# Patient Record
Sex: FEMALE | Race: OTHER | HISPANIC OR LATINO
[De-identification: names, ages, dates, MRNs, and addresses within clinical notes are randomized per-mention and may not be internally consistent; named-entity substitution may affect disease eponyms.]

---

## 2022-09-22 PROBLEM — Z00.00 ENCOUNTER FOR PREVENTIVE HEALTH EXAMINATION: Status: ACTIVE | Noted: 2022-09-22

## 2022-09-23 ENCOUNTER — APPOINTMENT (OUTPATIENT)
Dept: ULTRASOUND IMAGING | Facility: CLINIC | Age: 42
End: 2022-09-23

## 2022-09-23 ENCOUNTER — APPOINTMENT (OUTPATIENT)
Dept: MAMMOGRAPHY | Facility: CLINIC | Age: 42
End: 2022-09-23

## 2022-09-23 PROCEDURE — 77063 BREAST TOMOSYNTHESIS BI: CPT

## 2022-09-23 PROCEDURE — 77067 SCR MAMMO BI INCL CAD: CPT

## 2022-09-23 PROCEDURE — 93880 EXTRACRANIAL BILAT STUDY: CPT

## 2022-10-10 ENCOUNTER — APPOINTMENT (OUTPATIENT)
Dept: ULTRASOUND IMAGING | Facility: CLINIC | Age: 42
End: 2022-10-10

## 2022-10-10 PROCEDURE — 76642 ULTRASOUND BREAST LIMITED: CPT | Mod: LT

## 2023-08-31 ENCOUNTER — LABORATORY RESULT (OUTPATIENT)
Age: 43
End: 2023-08-31

## 2023-08-31 ENCOUNTER — APPOINTMENT (OUTPATIENT)
Dept: RHEUMATOLOGY | Facility: CLINIC | Age: 43
End: 2023-08-31
Payer: MEDICAID

## 2023-08-31 VITALS
BODY MASS INDEX: 24.63 KG/M2 | WEIGHT: 139 LBS | RESPIRATION RATE: 17 BRPM | HEIGHT: 63 IN | HEART RATE: 73 BPM | DIASTOLIC BLOOD PRESSURE: 108 MMHG | OXYGEN SATURATION: 99 % | TEMPERATURE: 98.1 F | SYSTOLIC BLOOD PRESSURE: 144 MMHG

## 2023-08-31 DIAGNOSIS — Z82.49 FAMILY HISTORY OF ISCHEMIC HEART DISEASE AND OTHER DISEASES OF THE CIRCULATORY SYSTEM: ICD-10-CM

## 2023-08-31 DIAGNOSIS — Z86.79 PERSONAL HISTORY OF OTHER DISEASES OF THE CIRCULATORY SYSTEM: ICD-10-CM

## 2023-08-31 DIAGNOSIS — Z78.9 OTHER SPECIFIED HEALTH STATUS: ICD-10-CM

## 2023-08-31 PROCEDURE — 99204 OFFICE O/P NEW MOD 45 MIN: CPT

## 2023-08-31 RX ORDER — AMLODIPINE BESYLATE 10 MG/1
10 TABLET ORAL
Refills: 0 | Status: ACTIVE | COMMUNITY

## 2023-08-31 NOTE — PHYSICAL EXAM
[General Appearance - Well Nourished] : well nourished [General Appearance - Well Developed] : well developed [Sclera] : the sclera and conjunctiva were normal [Hearing Threshold Finger Rub Not Kerr] : hearing was normal [Auscultation Breath Sounds / Voice Sounds] : lungs were clear to auscultation bilaterally [Heart Rate And Rhythm] : heart rate was normal and rhythm regular [Heart Sounds] : normal S1 and S2 [Nail Clubbing] : no clubbing  or cyanosis of the fingernails [Musculoskeletal - Swelling] : no joint swelling seen [Motor Tone] : muscle strength and tone were normal [] : no rash [Skin Lesions] : no skin lesions [Affect] : the affect was normal [Mood] : the mood was normal

## 2023-09-21 ENCOUNTER — APPOINTMENT (OUTPATIENT)
Dept: DERMATOLOGY | Facility: CLINIC | Age: 43
End: 2023-09-21
Payer: MEDICAID

## 2023-09-21 PROCEDURE — 99204 OFFICE O/P NEW MOD 45 MIN: CPT

## 2023-10-20 ENCOUNTER — APPOINTMENT (OUTPATIENT)
Dept: RHEUMATOLOGY | Facility: CLINIC | Age: 43
End: 2023-10-20
Payer: MEDICAID

## 2023-10-20 VITALS
DIASTOLIC BLOOD PRESSURE: 88 MMHG | SYSTOLIC BLOOD PRESSURE: 128 MMHG | HEIGHT: 63 IN | TEMPERATURE: 98.3 F | HEART RATE: 76 BPM | OXYGEN SATURATION: 98 %

## 2023-10-20 DIAGNOSIS — I83.90 ASYMPTOMATIC VARICOSE VEINS OF UNSPECIFIED LOWER EXTREMITY: ICD-10-CM

## 2023-10-20 PROCEDURE — 99214 OFFICE O/P EST MOD 30 MIN: CPT

## 2023-12-14 ENCOUNTER — APPOINTMENT (OUTPATIENT)
Dept: RHEUMATOLOGY | Facility: CLINIC | Age: 43
End: 2023-12-14
Payer: COMMERCIAL

## 2023-12-14 VITALS
DIASTOLIC BLOOD PRESSURE: 80 MMHG | BODY MASS INDEX: 24.8 KG/M2 | RESPIRATION RATE: 17 BRPM | WEIGHT: 140 LBS | HEIGHT: 63 IN | SYSTOLIC BLOOD PRESSURE: 120 MMHG | TEMPERATURE: 98.3 F

## 2023-12-14 DIAGNOSIS — M25.511 PAIN IN RIGHT SHOULDER: ICD-10-CM

## 2023-12-14 LAB
ANTI-BETA2 GLYCOPROTEIN 1 IGG CONCENTRATION: 2 U/ML
ANTI-BETA2 GLYCOPROTEIN 1 IGM CONCENTRATION: 2 U/ML
ANTI-CARDIOLIPIN IGG CONCENTRATION: 2 GPL
ANTI-CARDIOLIPIN IGM CONCENTRATION: 8 MPL
ANTI-CENP IGG CONCENTRATION: <0.4 U/ML
ANTI-CYCLIC CITRULLINATED PEPTIDE IGG CONCENTRATION: 1 U/ML
ANTI-DOUBLE-STRANDED DNA IGG CONCENTRATION: 41 IU/ML
ANTI-JO-1 IGG CONCENTRATION: <0.3 U/ML
ANTI-NUCLEAR ANTIBODIES - CYTOPLASMIC PATTERN: NORMAL
ANTI-NUCLEAR ANTIBODIES - PRIMARY NUCLEAR PATTERN: NORMAL
ANTI-NUCLEAR ANTIBODIES - PRIMARY PATTERN TITER: NEGATIVE
ANTI-NUCLEAR ANTIBODIES IGG CONCENTRATION: 101 UNITS
ANTI-RNA POL III IGG CONCENTRATION: 4 U/ML
ANTI-RNP70 IGG CONCENTRATION: 4 U/ML
ANTI-RO52 IGG CONCENTRATION: <0.3 U/ML
ANTI-RO60 IGG CONCENTRATION: 10 U/ML
ANTI-SCL-70 IGG CONCENTRATION: <0.6 U/ML
ANTI-SMITH IGG CONCENTRATION: <0.7 U/ML
ANTI-SS-B (LA) IGG CONCENTRATION: 0 U/ML
ANTI-THYROGLOBULIN IGG CONCENTRATION: <12 IU/ML
ANTI-THYROID PEROXIDASE IGG CONCENTRATION: <4 IU/ML
ANTI-U1RNP IGG CONCENTRATION: 2 U/ML
AVISE LUPUS INDEX: 0
AVISE LUPUS RESULT: NORMAL
B-LYMPHOCYTE-BOUND C4D (BC4D) LEVEL: 17
ENA SS-A AB SER IA-ACNC: 1.6 AL
ENA SS-B AB SER IA-ACNC: <0.2 AL
ERYTHROCYTE-BOUND C4D (EC4D) LEVEL: 5
RHEUMATOID FACTOR (IGA) CONCENTRATION: 6 IU/ML
RHEUMATOID FACTOR (IGM) CONCENTRATION: 1 IU/ML

## 2023-12-14 PROCEDURE — 99214 OFFICE O/P EST MOD 30 MIN: CPT

## 2023-12-14 NOTE — REVIEW OF SYSTEMS
[Palpitations] : palpitations [Fever] : no fever [Chills] : no chills [Recent Weight Loss (___ Lbs)] : no recent weight loss [Eye Pain] : no eye pain [Red Eyes] : eyes not red [Nosebleeds] : no nosebleeds [Nasal Discharge] : no nasal discharge [Chest Pain] : no chest pain [Cough] : no cough [SOB on Exertion] : no shortness of breath during exertion [Constipation] : no constipation [Diarrhea] : no diarrhea

## 2023-12-14 NOTE — PHYSICAL EXAM
[General Appearance - Well Nourished] : well nourished [General Appearance - Well Developed] : well developed [Sclera] : the sclera and conjunctiva were normal [Hearing Threshold Finger Rub Not [place]] : hearing was normal [Auscultation Breath Sounds / Voice Sounds] : lungs were clear to auscultation bilaterally [Heart Rate And Rhythm] : heart rate was normal and rhythm regular [Heart Sounds] : normal S1 and S2 [Nail Clubbing] : no clubbing  or cyanosis of the fingernails [Musculoskeletal - Swelling] : no joint swelling seen [Motor Tone] : muscle strength and tone were normal [] : no rash [Skin Lesions] : no skin lesions [Affect] : the affect was normal [Mood] : the mood was normal

## 2023-12-14 NOTE — PHYSICAL EXAM
[General Appearance - Well Nourished] : well nourished [General Appearance - Well Developed] : well developed [Sclera] : the sclera and conjunctiva were normal [Hearing Threshold Finger Rub Not [place]] : hearing was normal [Auscultation Breath Sounds / Voice Sounds] : lungs were clear to auscultation bilaterally [Heart Rate And Rhythm] : heart rate was normal and rhythm regular [Heart Sounds] : normal S1 and S2 [Nail Clubbing] : no clubbing  or cyanosis of the fingernails [Musculoskeletal - Swelling] : no joint swelling seen [Motor Tone] : muscle strength and tone were normal [] : no rash [Skin Lesions] : no skin lesions [Affect] : the affect was normal [Mood] : the mood was normal

## 2023-12-14 NOTE — HISTORY OF PRESENT ILLNESS
[FreeTextEntry1] : 44 yo woman with history of HTN , GERD and photosensitivity.    history of rash over the nose, dorsum hand and ear.  hand started 3 years ago she has been given topical treatment with no response.  she had steroid injection last year with resolution.  however, it did return 3 months ago.  she apparently had testing that suggest lupus.  skin biopsy showing tumid lupus  beside photosensitive rash, she also complaints of arthralgias particularly in the morning  she is noted to have positive CAYLA on AVISE test and positive Ro 1.6 she denies any dry eye or dry mouth       denies any alopecia, oral lesions, persistent dry eye or dry mouth, red painful eye, nose bleeding, dysphagia, hoarseness, facial rashes, sob, cough, cp, hx of serositis, hx low wbc, plts.   Gi issues, Raynaud's, weakness, DVt/PE, miscarriages.  pregnant 2 times -FT and non-complicated    ++GERD take tums  ++occasion SOB with palpitations associated with left shoulder last 30 minutes.  she relaxes and waits for the pain to pass.  . saw cardio and had echo and told this was normal   goes to the gym 2 times a week does cardio and wts with no cp or sob  Right shoulder pain at night,  after work.  sleeps on right shoulder.        PMH: as above  Surgery:none   Allergy: NKDA  MEDs: amlodipine and vit d  FH: no sle/ra, hypothyroidism  SH: from [facility], lives with  and kids, works in housekeeping ,  no alcohol/smoking or illicit drugs.

## 2023-12-14 NOTE — ASSESSMENT
[FreeTextEntry1] : 44 yo womna with history of HTn, GERD, lupus tumid with photosensitivity and arthralgias.  she is noted to have positive CAYLA ( on AVise) and Ro 1.6. t   --start HCQ --repeat serologies  --avoid sun bathing  --eye exam needed every 6-12 months while on HCQ

## 2023-12-19 ENCOUNTER — APPOINTMENT (OUTPATIENT)
Dept: DERMATOLOGY | Facility: CLINIC | Age: 43
End: 2023-12-19

## 2024-01-17 LAB
ALBUMIN SERPL ELPH-MCNC: 4.2 G/DL
ALP BLD-CCNC: 69 U/L
ALT SERPL-CCNC: 12 U/L
ANA SER IF-ACNC: NEGATIVE
ANION GAP SERPL CALC-SCNC: 12 MMOL/L
AST SERPL-CCNC: 16 U/L
BILIRUB SERPL-MCNC: 0.2 MG/DL
BUN SERPL-MCNC: 15 MG/DL
C3 SERPL-MCNC: 158 MG/DL
C4 SERPL-MCNC: 32 MG/DL
CALCIUM SERPL-MCNC: 9.4 MG/DL
CHLORIDE SERPL-SCNC: 102 MMOL/L
CO2 SERPL-SCNC: 24 MMOL/L
CREAT SERPL-MCNC: 0.76 MG/DL
CREAT SPEC-SCNC: 54 MG/DL
CREAT/PROT UR: 0.2 RATIO
EGFR: 100 ML/MIN/1.73M2
GLUCOSE SERPL-MCNC: 90 MG/DL
HCT VFR BLD CALC: 37.2 %
HGB BLD-MCNC: 12.1 G/DL
MCHC RBC-ENTMCNC: 30.6 PG
MCHC RBC-ENTMCNC: 32.5 GM/DL
MCV RBC AUTO: 94.2 FL
PLATELET # BLD AUTO: 293 K/UL
POTASSIUM SERPL-SCNC: 4 MMOL/L
PROT SERPL-MCNC: 7.4 G/DL
PROT UR-MCNC: 9 MG/DL
RBC # BLD: 3.95 M/UL
RBC # FLD: 12.9 %
SODIUM SERPL-SCNC: 138 MMOL/L
WBC # FLD AUTO: 5.91 K/UL

## 2024-02-15 NOTE — HISTORY OF PRESENT ILLNESS
[FreeTextEntry1] : 42 yo woman with history of HTN , GERD and photosensitivity.    history of rash over the nose, dorsum hand and ear.  hand started 3 years ago she has been given topical treatment with no response.  she had steroid injection last year with resolution.  however, it did return 3 months ago.  she apparently had testing that suggest lupus.  skin biopsy showing tumid lupus  beside photosensitive rash, she also complaints of arthralgias particularly in the morning  she is noted to have positive SONNY on AVISE test and positive Ro 1.6 she denies any dry eye or dry mouth  since last visit she started HCQ 400mg daily with improved skin rash and improved arthralgias   patient complains of right shoulder pain.  this is worse at night or after work.  she is a . she also sleeps on her right side.  pain on internal rotation.  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   Gi issues, Raynaud's, weakness, DVt/PE, miscarriages.  pregnant 2 times -FT and non-complicated    ++GERD take tums  ++occasion SOB with palpitations associated with left shoulder last 30 minutes.  she relaxes and waits for the pain to pass.  . saw cardio and had echo and told this was normal   goes to the gym 2 times a week does cardio and wts with no cp or sob  Right shoulder pain at night,  after work.  sleeps on right shoulder.        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

## 2024-02-15 NOTE — HISTORY OF PRESENT ILLNESS
[FreeTextEntry1] : 44 yo woman with history of HTN , GERD and photosensitivity.    history of rash over the nose, dorsum hand and ear.  hand started 3 years ago she has been given topical treatment with no response.  she had steroid injection last year with resolution.  however, did return 3 months ago.  she apparently had testing that suggest lupus.   denies any alopecia, oral lesions, persistent dry eye or dry mouth, red painful eye, nose bleeding, dysphagia, hoarseness, facial rashes, sob, cough, cp, hx of serositis, hx low wbc, plts.   Gi issues, Raynaud's, weakness, DVt/PE, miscarriages.  pregnant 2 times -FT and non-complicated    ++GERD take tums  ++occasion SOB with palpitations associated with left shoulder last 30 minutes.  she relaxes and waits for the pain to pass.  . saw cardio and had echo and told this was normal   goes to the gym 2 times a week does cardio and wts with no cp or sob  Right shoulder pain at night after work.  sleeps on right shoulder.  started 3 months ago.  labs 6/2023 negative CAYLA  normal cbc   skin biopsy: 5/2023 consistent with tumid lupus  there is unremarkable epidermis over a dense mononuclear inflammatory infiltrate around the vascular plexus and the hair follicles . there is vacuolar lateration of the follicular epithelium.        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

## 2024-02-15 NOTE — ASSESSMENT
[FreeTextEntry1] : 44 yo womna with history of HTn, GERD, lupus tumid with photosensitivity and arthralgias.  she is noted to have positive CAYLA ( on AVise) and Ro 1.6.  started on HCQ with improvement.  tumid lupus is usually a skin limied disorder taht responds to photoprotection and HCQ, rare to be associated with systemic lupus.  will monitor possible evolving CTD   --cont HCQ ( 400mg monday-friday, on weekend only 200mg ) weight 140lbs  --repeat urine studies and lupus labs  --avoid sun exposure--wear hats, long sleeve shirts and sun block  --eye exam needed every 6-12 months while on HCQ --for right shoulder pain: start PT, avoid sleeping on her right side and mobic for 2 weeks.  possible tendinitis

## 2024-02-15 NOTE — ASSESSMENT
[FreeTextEntry1] : 42 yo woman with photosensitive rash, rash involving the nose, ear and  hands, and arthralgias.  Skin Biospy suggestive of tumid lupus.  SONNY negative.  patient unlikely to have systemic erythematosus lupus   --will repeat sonny and check sub-serologies and urine studies  --avoid sunbathing, use hat, long sleeve shirts and sun screen when out doors   --start HCQ --f/u with derm

## 2024-05-10 ENCOUNTER — APPOINTMENT (OUTPATIENT)
Dept: RHEUMATOLOGY | Facility: CLINIC | Age: 44
End: 2024-05-10

## 2024-05-10 VITALS
OXYGEN SATURATION: 98 % | RESPIRATION RATE: 17 BRPM | SYSTOLIC BLOOD PRESSURE: 120 MMHG | BODY MASS INDEX: 25.52 KG/M2 | DIASTOLIC BLOOD PRESSURE: 80 MMHG | WEIGHT: 144 LBS | HEIGHT: 63 IN | HEART RATE: 59 BPM | TEMPERATURE: 98 F

## 2024-05-10 DIAGNOSIS — R10.13 EPIGASTRIC PAIN: ICD-10-CM

## 2024-05-10 DIAGNOSIS — L93.0 DISCOID LUPUS ERYTHEMATOSUS: ICD-10-CM

## 2024-05-10 DIAGNOSIS — K21.9 GASTRO-ESOPHAGEAL REFLUX DISEASE W/OUT ESOPHAGITIS: ICD-10-CM

## 2024-05-10 PROCEDURE — 99214 OFFICE O/P EST MOD 30 MIN: CPT

## 2024-05-10 RX ORDER — MELOXICAM 15 MG/1
15 TABLET ORAL
Qty: 30 | Refills: 0 | Status: DISCONTINUED | COMMUNITY
Start: 2023-12-14 | End: 2024-05-10

## 2024-05-10 RX ORDER — HYDROXYCHLOROQUINE SULFATE 200 MG/1
200 TABLET, FILM COATED ORAL
Qty: 60 | Refills: 3 | Status: ACTIVE | COMMUNITY
Start: 2023-10-20 | End: 1900-01-01

## 2024-05-10 RX ORDER — OMEPRAZOLE 20 MG/1
20 TABLET, DELAYED RELEASE ORAL
Qty: 30 | Refills: 3 | Status: ACTIVE | COMMUNITY
Start: 2024-05-10 | End: 1900-01-01

## 2024-05-10 RX ORDER — VITAMIN K2 90 MCG
125 MCG CAPSULE ORAL
Refills: 0 | Status: DISCONTINUED | COMMUNITY
End: 2024-05-10

## 2024-05-10 NOTE — REVIEW OF SYSTEMS
[Fever] : no fever [Chills] : no chills [Recent Weight Loss (___ Lbs)] : no recent weight loss [Eye Pain] : no eye pain [Red Eyes] : eyes not red [Nosebleeds] : no nosebleeds [Nasal Discharge] : no nasal discharge [Chest Pain] : no chest pain [Palpitations] : palpitations [Cough] : no cough [SOB on Exertion] : no shortness of breath during exertion [Constipation] : no constipation [Diarrhea] : no diarrhea

## 2024-05-10 NOTE — PHYSICAL EXAM
[General Appearance - Well Nourished] : well nourished [General Appearance - Well Developed] : well developed [Sclera] : the sclera and conjunctiva were normal [Hearing Threshold Finger Rub Not Sharp] : hearing was normal [Auscultation Breath Sounds / Voice Sounds] : lungs were clear to auscultation bilaterally [Heart Rate And Rhythm] : heart rate was normal and rhythm regular [Heart Sounds] : normal S1 and S2 [] : no rash [Skin Lesions] : no skin lesions [Affect] : the affect was normal [Mood] : the mood was normal [Nail Clubbing] : no clubbing  or cyanosis of the fingernails [Musculoskeletal - Swelling] : no joint swelling seen [Motor Tone] : muscle strength and tone were normal

## 2024-05-10 NOTE — HISTORY OF PRESENT ILLNESS
[FreeTextEntry1] : 44 yo woman with history of HTN, GERD and photosensitivity.    history of rash over the nose, dorsum hand and ear.  hand started 3 years ago she has been given topical treatment with no response.  she had steroid injection last year with resolution.  however, it did return 3 months ago.  she apparently had testing that suggest lupus.  skin biopsy showing tumid lupus  beside photosensitive rash, she also complaints of arthralgias particularly in the morning  she is noted to have positive SONNY on AVISE test and positive Ro 1.6 she denies any dry eye or dry mouth  since last visit she started HCQ 400mg daily with improved skin rash and improved arthralgias   patient complains of right shoulder pain.  this is worse at night or after work.  she is a . she also sleeps on her right side.  pain on internal rotation.  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   Gi issues, Raynaud's, weakness, DVt/PE, miscarriages.  pregnant 2 times -FT and non-complicated    ++GERD take tums  ++occasion SOB with palpitations associated with left shoulder last 30 minutes.  she relaxes and waits for the pain to pass.  . saw cardio and had echo and told this was normal   goes to the gym 2 times a week does cardio and wts with no cp or sob  Right shoulder pain at night,  after work.  sleeps on right shoulder.        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

## 2024-08-09 ENCOUNTER — APPOINTMENT (OUTPATIENT)
Dept: RHEUMATOLOGY | Facility: CLINIC | Age: 44
End: 2024-08-09